# Patient Record
Sex: MALE | Race: WHITE | NOT HISPANIC OR LATINO | Employment: OTHER | URBAN - METROPOLITAN AREA
[De-identification: names, ages, dates, MRNs, and addresses within clinical notes are randomized per-mention and may not be internally consistent; named-entity substitution may affect disease eponyms.]

---

## 2017-04-13 RX ORDER — ACETYLCYSTEINE 600 MG
600 CAPSULE ORAL
COMMUNITY
End: 2017-04-18 | Stop reason: ALTCHOICE

## 2017-04-18 ENCOUNTER — OFFICE VISIT (OUTPATIENT)
Dept: VASCULAR SURGERY | Facility: CLINIC | Age: 82
End: 2017-04-18
Payer: MEDICARE

## 2017-04-18 VITALS — WEIGHT: 189.88 LBS | BODY MASS INDEX: 26.58 KG/M2 | HEIGHT: 71 IN

## 2017-04-18 DIAGNOSIS — Z95.1 S/P CABG (CORONARY ARTERY BYPASS GRAFT): ICD-10-CM

## 2017-04-18 DIAGNOSIS — N18.4 CKD (CHRONIC KIDNEY DISEASE) STAGE 4, GFR 15-29 ML/MIN: ICD-10-CM

## 2017-04-18 PROCEDURE — 99212 OFFICE O/P EST SF 10 MIN: CPT | Mod: PBBFAC,PO | Performed by: THORACIC SURGERY (CARDIOTHORACIC VASCULAR SURGERY)

## 2017-04-18 PROCEDURE — 99999 PR PBB SHADOW E&M-EST. PATIENT-LVL II: CPT | Mod: PBBFAC,,, | Performed by: THORACIC SURGERY (CARDIOTHORACIC VASCULAR SURGERY)

## 2017-04-18 PROCEDURE — 99024 POSTOP FOLLOW-UP VISIT: CPT | Mod: ,,, | Performed by: THORACIC SURGERY (CARDIOTHORACIC VASCULAR SURGERY)

## 2017-04-18 RX ORDER — CLOPIDOGREL BISULFATE 75 MG/1
75 TABLET ORAL DAILY
Qty: 90 TABLET | Refills: 3 | Status: SHIPPED | OUTPATIENT
Start: 2017-04-18

## 2017-04-18 RX ORDER — ASCORBIC ACID 500 MG
500 TABLET ORAL
COMMUNITY
Start: 2017-04-11 | End: 2017-05-11

## 2017-04-18 RX ORDER — VENLAFAXINE HYDROCHLORIDE 150 MG/1
150 CAPSULE, EXTENDED RELEASE ORAL
COMMUNITY
Start: 2017-04-11 | End: 2017-04-18

## 2017-04-18 RX ORDER — LACTULOSE 10 G/15ML
20 SOLUTION ORAL
COMMUNITY
Start: 2017-04-11

## 2017-04-18 RX ORDER — POTASSIUM CHLORIDE 20 MEQ/1
20 TABLET, EXTENDED RELEASE ORAL
COMMUNITY
Start: 2017-04-11 | End: 2017-05-11

## 2017-04-18 RX ORDER — FERROUS GLUCONATE 324(37.5)
324 TABLET ORAL
COMMUNITY
Start: 2017-04-11 | End: 2017-05-30

## 2017-04-18 RX ORDER — HYDROCODONE BITARTRATE AND ACETAMINOPHEN 5; 325 MG/1; MG/1
TABLET ORAL
COMMUNITY
Start: 2017-03-01

## 2017-04-18 RX ORDER — ZINC SULFATE 50(220)MG
220 CAPSULE ORAL
COMMUNITY
Start: 2017-04-11 | End: 2017-05-11

## 2017-04-18 RX ORDER — DOCUSATE SODIUM 100 MG/1
100 CAPSULE, LIQUID FILLED ORAL
COMMUNITY
Start: 2017-04-11 | End: 2017-05-11

## 2017-04-18 RX ORDER — METOPROLOL TARTRATE 25 MG/1
25 TABLET, FILM COATED ORAL
COMMUNITY
Start: 2017-04-11 | End: 2017-04-18

## 2017-04-18 RX ORDER — OLANZAPINE 2.5 MG/1
2.5 TABLET ORAL
COMMUNITY
Start: 2017-04-12 | End: 2017-05-30 | Stop reason: ALTCHOICE

## 2017-04-18 NOTE — PROGRESS NOTES
CLINIC NOTE    Mr. Watt is a pleasant 85-year-old white male who developed multivessel   coronary artery disease with unstable angina.  On 03/13/2017, he underwent   two-vessel coronary artery bypass with left internal thoracic artery to the left   anterior descending and reverse saphenous vein graft to an obtuse marginal   branch.  He was quite weak before surgery and remains weak.  His angina has   resolved.  He is using a wheelchair for most activities, but trying to   rehabilitate.    MEDICATIONS AND ALLERGIES:  Reviewed.    PHYSICAL EXAMINATION:  VITAL SIGNS:  Blood pressure 173/86, heart rate 78, weight 169 pounds.  CHEST:  Sternum is stable.  Sternotomy incisions are healed.  HEART:  Regular rate and rhythm.  No rubs.  EXTREMITIES:  Leg incisions are healed.  There is 1+ edema on the right leg and   1- edema in the left leg.    IMPRESSION:  The patient is recovering fairly well from surgical intervention.    PLAN:  The patient will resume Plavix 75 mg daily.  He will follow with me in   six weeks.  He should begin participating in rehabilitation and can use his   upper body for activities less than 15 to 20 pounds.  He should not support all   of his weight on his arms, but he can use a walker.  He is allowed to lift his   hands over his head.      BRITANY/AYSHA  dd: 04/18/2017 17:05:18 (CDT)  td: 04/18/2017 18:48:28 (CDT)  Doc ID   #[id number]  Job ID #[id number]    CC: Leonela Bush M.D.

## 2017-04-18 NOTE — MR AVS SNAPSHOT
[facility]  [address] [facility] [address]  Phone: [phone]                  Alex Watt   2017 3:30 PM   Office Visit    Description:  Male : 1931   Provider:  Virgil Ward MD   [facility]           Reason for Visit     Coronary Artery Disease           Diagnoses this Visit        Comments    S/P CABG (coronary artery bypass graft)         CKD (chronic kidney disease) stage 4, GFR 15-29 ml/min                To Do List           Goals (5 Years of Data)     None      Follow-Up and Disposition     Return in about 6 weeks (around 2017).       These Medications        Disp Refills Start End    clopidogrel (PLAVIX) 75 mg tablet 90 tablet 3 2017     Take 1 tablet (75 mg total) by mouth once daily. - Oral    [facility] [place] [facility] [address] [address]  #: [phone]         [facility]     [facility] On Call Nurse Care Line -  Assistance  Unless otherwise directed by your provider, please contact [facility], our nurse care line that is available for  assistance.     Registered nurses in the [facility] provide: appointment scheduling, clinical advisement, health education, and other advisory services.  Call: [phone] (toll free)               Medications           CHANGE how you are taking these medications     Start Taking Instead of    clopidogrel (PLAVIX) 75 mg tablet clopidogrel (PLAVIX) 75 mg tablet    Dosage:  Take 1 tablet (75 mg total) by mouth once daily. Dosage:  Take 75 mg by mouth once daily.    Reason for Change:  Reorder       STOP taking these medications     hydrocodone-acetaminophen 7.5-325mg (NORCO) 7.5-325 mg per tablet Take 1 tablet by mouth every 6 (six) hours as needed for Pain.    metoprolol succinate (TOPROL-XL) 50 MG 24 hr tablet Take 50 mg by mouth once daily.    metoprolol tartrate (LOPRESSOR) 25 MG tablet Take 25 mg by  mouth.    paroxetine (PAXIL) 10 MG tablet Take 10 mg by mouth every morning.    venlafaxine (EFFEXOR-XR) 150 MG Cp24 Take 150 mg by mouth.    mirabegron (MYRBETRIQ) 25 mg Tb24 ER tablet Take 25 mg by mouth.    lisinopril (PRINIVIL,ZESTRIL) 20 MG tablet Take 20 mg by mouth once daily.    nabumetone (RELAFEN) 500 MG tablet Take 500 mg by mouth 2 (two) times daily.    loratadine (CLARITIN) 10 mg tablet Take 10 mg by mouth once daily.    furosemide (LASIX) 40 MG tablet Take 40 mg by mouth 2 (two) times daily.    diclofenac (VOLTAREN) 0.1 % ophthalmic solution 1 drop 4 (four) times daily.    calcitRIOL (ROCALTROL) 0.25 MCG Cap Take 0.25 mcg by mouth once daily.    calcium carbonate (OS-KYLAH) 600 mg (1,500 mg) Tab Take 600 mg by mouth 2 (two) times daily with meals.    amlodipine (NORVASC) 5 MG tablet Take 5 mg by mouth once daily.    acetylcysteine 600 mg Cap Take 600 mg by mouth.           Verify that the below list of medications is an accurate representation of the medications you are currently taking.  If none reported, the list may be blank. If incorrect, please contact your healthcare provider. Carry this list with you in case of emergency.           Current Medications     ascorbic acid, vitamin C, (VITAMIN C) 500 MG tablet Take 500 mg by mouth.    aspirin 81 MG Chew Take 81 mg by mouth once daily.    cetirizine (ZYRTEC) 10 MG tablet Take 10 mg by mouth once daily.    clopidogrel (PLAVIX) 75 mg tablet Take 1 tablet (75 mg total) by mouth once daily.    diazePAM (VALIUM) 5 MG tablet Take 5 mg by mouth every 6 (six) hours as needed for Anxiety.    docusate sodium (COLACE) 100 MG capsule Take 100 mg by mouth.    ferrous gluconate 324 mg (37.5 mg iron) Tab Take 324 mg by mouth.    hydrocodone-acetaminophen 5-325mg (NORCO) 5-325 mg per tablet     lactulose (CHRONULAC) 20 gram/30 mL Soln Take 20 g by mouth.    nitroGLYCERIN (NITROSTAT) 0.3 MG SL tablet Place 0.3 mg under the tongue every 5 (five) minutes as needed for  "Chest pain.    olanzapine (ZYPREXA) 2.5 MG tablet Take 2.5 mg by mouth.    OM3-DHA-EPA-D3-LUTEIN-ZEAZANTH ORAL Take by mouth.    potassium chloride SA (K-DUR,KLOR-CON) 20 MEQ tablet Take 20 mEq by mouth.    simvastatin (ZOCOR) 40 MG tablet Take 40 mg by mouth every evening.    zinc sulfate (ZINCATE) 220 (50) mg capsule Take 220 mg by mouth.           Clinical Reference Information           Your Vitals Were     Height Weight BMI          5' 11" (1.803 m) 86.1 kg (189 lb 14.4 oz) 26.49 kg/m2        Allergies as of 4/18/2017     Ciprofloxacin      Immunizations Administered on Date of Encounter - 4/18/2017     None      [facility] Sign-Up     Activating your [facility] account is as easy as 1-2-3!     1) Visit [email or web address], select Sign Up Now, enter this activation code and your date of birth, then select Next.  [email or web address]  Expires: 6/2/2017  5:05 PM      2) Create a username and password to use when you visit [facility] in the future and select a security question in case you lose your password and select Next.    3) Enter your e-mail address and click Sign Up!    Additional Information  If you have questions, please e-mail [email or web address] or call [phone] to talk to our [facility] staff. Remember, [facility] is NOT to be used for urgent needs. For medical emergencies, dial 911.         Language Assistance Services     ATTENTION: Language assistance services are available, free of charge. Please call [phone].      ATENCIÓN: Si habla español, tiene a henderson disposición servicios gratuitos de asistencia lingüística. Llame al [phone].     CHÚ Ý: N?u b?n nói Ti?ng Vi?t, có các d?ch v? h? tr? ngôn ng? mi?n phí dành cho b?n. G?i s? [phone].         [facility] complies with applicable Federal civil rights laws and does not discriminate on the basis of race, color, national origin, age, disability, or sex.        "

## 2017-05-30 ENCOUNTER — OFFICE VISIT (OUTPATIENT)
Dept: VASCULAR SURGERY | Facility: CLINIC | Age: 82
End: 2017-05-30
Payer: MEDICARE

## 2017-05-30 VITALS — DIASTOLIC BLOOD PRESSURE: 75 MMHG | SYSTOLIC BLOOD PRESSURE: 157 MMHG | HEART RATE: 79 BPM | HEIGHT: 71 IN

## 2017-05-30 DIAGNOSIS — Z95.1 S/P CABG (CORONARY ARTERY BYPASS GRAFT): Primary | ICD-10-CM

## 2017-05-30 PROCEDURE — 99024 POSTOP FOLLOW-UP VISIT: CPT | Mod: ,,, | Performed by: THORACIC SURGERY (CARDIOTHORACIC VASCULAR SURGERY)

## 2017-05-30 PROCEDURE — 99999 PR PBB SHADOW E&M-EST. PATIENT-LVL III: CPT | Mod: PBBFAC,,, | Performed by: THORACIC SURGERY (CARDIOTHORACIC VASCULAR SURGERY)

## 2017-05-30 PROCEDURE — 99213 OFFICE O/P EST LOW 20 MIN: CPT | Mod: PBBFAC,PO | Performed by: THORACIC SURGERY (CARDIOTHORACIC VASCULAR SURGERY)

## 2017-05-30 RX ORDER — TRAZODONE HYDROCHLORIDE 50 MG/1
TABLET ORAL
COMMUNITY
Start: 2017-04-26

## 2017-05-30 RX ORDER — LISINOPRIL 20 MG/1
TABLET ORAL
COMMUNITY
Start: 2017-05-01 | End: 2017-05-30 | Stop reason: ALTCHOICE

## 2017-05-30 RX ORDER — ATORVASTATIN CALCIUM 20 MG/1
TABLET, FILM COATED ORAL
COMMUNITY
Start: 2017-05-02

## 2017-05-30 RX ORDER — VENLAFAXINE HYDROCHLORIDE 150 MG/1
CAPSULE, EXTENDED RELEASE ORAL
COMMUNITY
Start: 2017-05-10

## 2017-05-30 RX ORDER — AMLODIPINE BESYLATE 10 MG/1
10 TABLET ORAL DAILY
COMMUNITY

## 2017-05-30 RX ORDER — METOPROLOL TARTRATE 50 MG/1
50 TABLET ORAL 2 TIMES DAILY
COMMUNITY

## 2017-05-30 RX ORDER — ALLOPURINOL 100 MG/1
TABLET ORAL
COMMUNITY
Start: 2017-05-23

## 2017-05-30 RX ORDER — FUROSEMIDE 20 MG/1
TABLET ORAL
COMMUNITY
Start: 2017-05-03

## 2017-05-30 RX ORDER — MIRABEGRON 25 MG/1
TABLET, FILM COATED, EXTENDED RELEASE ORAL
COMMUNITY
Start: 2017-05-11

## 2017-05-30 NOTE — PROGRESS NOTES
CLINIC NOTE    Mr. Watt is 85 years old.  On 03/13/2017, he underwent two-vessel coronary   artery bypass.  He has been recovering well since surgery.  His angina has   resolved.  He has done some exercises in the pool and finds this beneficial.    MEDICATIONS AND ALLERGIES:  Reviewed.    PHYSICAL EXAMINATION:  VITAL SIGNS:  Blood pressure 157/75, heart rate 79.  GENERAL:  He appears well.  He is seated in a wheelchair.  CHEST:  Sternum is stable.  Sternotomy incisions are healed.  LUNGS:  Clear bilaterally.  HEART:  Regular rate and rhythm.  No rubs.  EXTREMITIES:  Right leg incision is healed.  There is no significant edema.    IMPRESSION:  The patient recovering well from coronary artery bypass surgery.    PLAN:  Continue activities without restrictions.  Follow with me as needed.      BRITANY/AYSHA  dd: 05/30/2017 09:56:55 (CDT)  td: 05/30/2017 21:54:00 (CDT)  Doc ID   #[id number]  Job ID #[id number]    CC: Leonela Bush M.D.